# Patient Record
Sex: MALE | Race: WHITE | NOT HISPANIC OR LATINO | Employment: UNEMPLOYED | URBAN - METROPOLITAN AREA
[De-identification: names, ages, dates, MRNs, and addresses within clinical notes are randomized per-mention and may not be internally consistent; named-entity substitution may affect disease eponyms.]

---

## 2020-07-09 ENCOUNTER — HOSPITAL ENCOUNTER (EMERGENCY)
Facility: HOSPITAL | Age: 35
Discharge: HOME | End: 2020-07-09
Attending: EMERGENCY MEDICINE | Admitting: EMERGENCY MEDICINE

## 2020-07-09 ENCOUNTER — APPOINTMENT (EMERGENCY)
Dept: RADIOLOGY | Facility: HOSPITAL | Age: 35
End: 2020-07-09

## 2020-07-09 VITALS
SYSTOLIC BLOOD PRESSURE: 119 MMHG | DIASTOLIC BLOOD PRESSURE: 63 MMHG | HEART RATE: 78 BPM | WEIGHT: 184 LBS | TEMPERATURE: 97.7 F | HEIGHT: 68 IN | RESPIRATION RATE: 18 BRPM | BODY MASS INDEX: 27.89 KG/M2 | OXYGEN SATURATION: 97 %

## 2020-07-09 DIAGNOSIS — M70.20 OLECRANON BURSITIS: Primary | ICD-10-CM

## 2020-07-09 PROCEDURE — 73080 X-RAY EXAM OF ELBOW: CPT

## 2020-07-09 PROCEDURE — 99283 EMERGENCY DEPT VISIT LOW MDM: CPT

## 2020-07-09 PROCEDURE — 99284 EMERGENCY DEPT VISIT MOD MDM: CPT | Performed by: PHYSICIAN ASSISTANT

## 2020-07-09 RX ORDER — NAPROXEN 500 MG/1
500 TABLET ORAL ONCE
Status: COMPLETED | OUTPATIENT
Start: 2020-07-09 | End: 2020-07-09

## 2020-07-09 RX ADMIN — NAPROXEN 500 MG: 500 TABLET ORAL at 13:05

## 2020-07-09 NOTE — ED PROVIDER NOTES
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  Has not been taking anything for pain  Has a golf ball size area of swelling over his elbow with mild pain to palpation  No difficulty bending or extending his arm  No change in strength  No IVDU, fever, chills  No purulent drainage  No trauma or falls  No numbness or tingling  No insect bites  None       History reviewed  No pertinent past medical history  Past Surgical History:   Procedure Laterality Date    RIB FRACTURE SURGERY      from thromosis outlet syndrome       History reviewed  No pertinent family history  I have reviewed and agree with the history as documented  E-Cigarette/Vaping    E-Cigarette Use Current Every Day User      E-Cigarette/Vaping Substances     Social History     Tobacco Use    Smoking status: Never Smoker    Smokeless tobacco: Never Used   Substance Use Topics    Alcohol use: Yes     Frequency: 2-3 times a week    Drug use: Yes     Types: Marijuana       Review of Systems   Constitutional: Negative for chills and fever  HENT: Negative for sneezing and sore throat  Respiratory: Negative for cough and shortness of breath  Cardiovascular: Negative for chest pain, palpitations and leg swelling  Gastrointestinal: Negative for abdominal pain, constipation, diarrhea, nausea and vomiting  Musculoskeletal: Positive for joint swelling and myalgias  Negative for back pain and gait problem  Skin: Negative for color change, pallor, rash and wound  Neurological: Negative for dizziness, syncope, weakness, light-headedness, numbness and headaches  All other systems reviewed and are negative        Physical Exam  Physical Exam   Constitutional: He appears well-developed and well-nourished  No distress  HENT:   Head: Normocephalic and atraumatic  Nose: Nose normal    Eyes: EOM are normal    Neck: Normal range of motion  Cardiovascular: Normal rate, regular rhythm, normal heart sounds and intact distal pulses  Exam reveals no gallop and no friction rub  No murmur heard  Pulmonary/Chest: Effort normal and breath sounds normal  No stridor  No respiratory distress  He has no wheezes  He has no rales  Sp02 is 97% indicating adequate oxygenation on room air   Musculoskeletal:        Arms:  Skin: Skin is warm and dry  Capillary refill takes less than 2 seconds  No rash noted  He is not diaphoretic  No erythema  No pallor  Nursing note and vitals reviewed  Vital Signs  ED Triage Vitals [07/09/20 1144]   Temperature Pulse Respirations Blood Pressure SpO2   97 7 °F (36 5 °C) 78 18 119/63 97 %      Temp Source Heart Rate Source Patient Position - Orthostatic VS BP Location FiO2 (%)   Tympanic Monitor Sitting Left arm --      Pain Score       5           Vitals:    07/09/20 1144   BP: 119/63   Pulse: 78   Patient Position - Orthostatic VS: Sitting         Visual Acuity      ED Medications  Medications   naproxen (NAPROSYN) tablet 500 mg (500 mg Oral Given 7/9/20 1305)       Diagnostic Studies  Results Reviewed     None                 XR elbow 3+ vw RIGHT   Final Result by Dillon Bob MD (07/09 1403)      Soft tissue swelling over the olecranon, possibly on the basis of an olecranon bursitis  Workstation performed: [id number]                    Procedures  Procedures         ED Course                                             MDM  Number of Diagnoses or Management Options  Olecranon bursitis:   Diagnosis management comments: Advised rest, ice, ace wrap, and nsaids prn pain  Given orthopedic follow up if symptoms worsen  Gave patient proper education regarding diagnosis  Answered all questions   Return to ED for any worsening of symptoms otherwise follow up with primary care physician for re-evaluation  Discussed plan with patient who verbalized understanding and agreed to plan  Amount and/or Complexity of Data Reviewed  Tests in the radiology section of CPT®: ordered and reviewed  Discussion of test results with the performing providers: yes  Review and summarize past medical records: yes  Discuss the patient with other providers: yes  Independent visualization of images, tracings, or specimens: yes          Disposition  Final diagnoses:   Olecranon bursitis     Time reflects when diagnosis was documented in both MDM as applicable and the Disposition within this note     Time User Action Codes Description Comment    7/9/2020  2:02 PM Radha Schofield [M70 20] Olecranon bursitis       ED Disposition     ED Disposition Condition Date/Time Comment    Discharge Stable u Jul 9, 2020  2:02 PM Azar Izquierdo discharge to home/self care  Follow-up Information     Follow up With Specialties Details Why Contact Info Additional Information    Susan Morris MD Orthopedic Surgery Schedule an appointment as soon as possible for a visit in 1 week If symptoms worsen [phone]       [address] [place] [address] Emergency Department Emergency Medicine Go to  As needed [address] [place] [address]  [phone] [facility], [place], [place], [address]          Patient's Medications    No medications on file     No discharge procedures on file      PDMP Review     None          ED Provider  Electronically Signed by           Kaylie Paige PA-C  07/09/20 8209

## 2024-12-19 ENCOUNTER — NEW PATIENT (OUTPATIENT)
Age: 39
End: 2024-12-19

## 2024-12-19 DIAGNOSIS — T15.02XA: ICD-10-CM

## 2024-12-19 PROCEDURE — 92002 INTRM OPH EXAM NEW PATIENT: CPT
